# Patient Record
Sex: MALE | Race: WHITE | NOT HISPANIC OR LATINO | URBAN - METROPOLITAN AREA
[De-identification: names, ages, dates, MRNs, and addresses within clinical notes are randomized per-mention and may not be internally consistent; named-entity substitution may affect disease eponyms.]

---

## 2020-07-25 ENCOUNTER — TELEPHONE ENCOUNTER (OUTPATIENT)
Dept: URBAN - METROPOLITAN AREA CLINIC 13 | Facility: CLINIC | Age: 35
End: 2020-07-25

## 2020-07-26 ENCOUNTER — TELEPHONE ENCOUNTER (OUTPATIENT)
Dept: URBAN - METROPOLITAN AREA CLINIC 13 | Facility: CLINIC | Age: 35
End: 2020-07-26

## 2020-07-26 RX ORDER — OMEPRAZOLE 40 MG/1
TAKE 1 CAPSULE DAILY CAPSULE, DELAYED RELEASE ORAL
Qty: 30 | Refills: 1 | Status: ACTIVE | COMMUNITY

## 2021-04-16 ENCOUNTER — WEB ENCOUNTER (OUTPATIENT)
Dept: URBAN - METROPOLITAN AREA CLINIC 107 | Facility: CLINIC | Age: 36
End: 2021-04-16

## 2021-04-16 ENCOUNTER — OFFICE VISIT (OUTPATIENT)
Dept: URBAN - METROPOLITAN AREA CLINIC 107 | Facility: CLINIC | Age: 36
End: 2021-04-16
Payer: COMMERCIAL

## 2021-04-16 VITALS
HEIGHT: 71 IN | DIASTOLIC BLOOD PRESSURE: 89 MMHG | BODY MASS INDEX: 27.86 KG/M2 | SYSTOLIC BLOOD PRESSURE: 136 MMHG | TEMPERATURE: 98.6 F | WEIGHT: 199 LBS | RESPIRATION RATE: 18 BRPM | HEART RATE: 81 BPM

## 2021-04-16 DIAGNOSIS — R10.13 EPIGASTRIC PAIN: ICD-10-CM

## 2021-04-16 DIAGNOSIS — K21.9 GASTROESOPHAGEAL REFLUX DISEASE, UNSPECIFIED WHETHER ESOPHAGITIS PRESENT: ICD-10-CM

## 2021-04-16 DIAGNOSIS — K27.9 PUD (PEPTIC ULCER DISEASE): ICD-10-CM

## 2021-04-16 DIAGNOSIS — R19.4 CHANGE IN BOWEL HABIT: ICD-10-CM

## 2021-04-16 PROCEDURE — 99244 OFF/OP CNSLTJ NEW/EST MOD 40: CPT | Performed by: INTERNAL MEDICINE

## 2021-04-16 RX ORDER — OMEPRAZOLE 40 MG/1
TAKE 1 CAPSULE DAILY CAPSULE, DELAYED RELEASE ORAL
Qty: 30 | Refills: 1 | Status: ACTIVE | COMMUNITY

## 2021-04-16 NOTE — HPI-TODAY'S VISIT:
The patient was referred by Dr. Devendra Mascorro for PUD.   A copy of this document is being forwarded to the referring provider.  35-year-old male presenting for an initial evaluation.  He does have a prior history of peptic ulcer disease and a duodenal ulcer in the past. He was previously seen in 2015 and had an upper endoscopy which demonstrated a duodenal ulcer in the bulb.  Biopsies were negative for H. pylori or intestinal metaplasia.  He presents today with similar symptoms and feeling like he has another ulcer. He was taking Advil at that time.   He did take 1 motrin within the last 30 days for a strained back muscle.   He did have a colonoscopy in 2015 by Dr. Marcin Trejo for rectal bleeding.  This exam was unremarkable.  Currently he has epigsatric abdominal pain/bloating. He also has a change in his bowel habits but not any blood. He does have an increased frequency to his bowel movements and they are loose.

## 2021-05-10 ENCOUNTER — OFFICE VISIT (OUTPATIENT)
Dept: URBAN - METROPOLITAN AREA SURGERY CENTER 25 | Facility: SURGERY CENTER | Age: 36
End: 2021-05-10
Payer: COMMERCIAL

## 2021-05-10 ENCOUNTER — CLAIMS CREATED FROM THE CLAIM WINDOW (OUTPATIENT)
Dept: URBAN - METROPOLITAN AREA CLINIC 4 | Facility: CLINIC | Age: 36
End: 2021-05-10
Payer: COMMERCIAL

## 2021-05-10 DIAGNOSIS — K31.89 GASTRIC PERFORATION WITHOUT ULCER: ICD-10-CM

## 2021-05-10 DIAGNOSIS — K20.0 EOSINOPHILIC ESOPHAGITIS: ICD-10-CM

## 2021-05-10 DIAGNOSIS — K21.9 ACID REFLUX: ICD-10-CM

## 2021-05-10 DIAGNOSIS — K22.8 COLUMNAR-LINED ESOPHAGUS: ICD-10-CM

## 2021-05-10 DIAGNOSIS — K31.89 ACQUIRED DEFORMITY OF DUODENUM: ICD-10-CM

## 2021-05-10 PROCEDURE — 88305 TISSUE EXAM BY PATHOLOGIST: CPT | Performed by: PATHOLOGY

## 2021-05-10 PROCEDURE — 43239 EGD BIOPSY SINGLE/MULTIPLE: CPT | Performed by: INTERNAL MEDICINE

## 2021-05-10 PROCEDURE — G8907 PT DOC NO EVENTS ON DISCHARG: HCPCS | Performed by: INTERNAL MEDICINE

## 2021-05-10 PROCEDURE — 88312 SPECIAL STAINS GROUP 1: CPT | Performed by: PATHOLOGY

## 2021-05-10 RX ORDER — OMEPRAZOLE 40 MG/1
TAKE 1 CAPSULE DAILY CAPSULE, DELAYED RELEASE ORAL
Qty: 30 | Refills: 1 | Status: ACTIVE | COMMUNITY

## 2021-06-03 ENCOUNTER — LAB OUTSIDE AN ENCOUNTER (OUTPATIENT)
Dept: URBAN - METROPOLITAN AREA CLINIC 113 | Facility: CLINIC | Age: 36
End: 2021-06-03

## 2021-06-03 ENCOUNTER — OFFICE VISIT (OUTPATIENT)
Dept: URBAN - METROPOLITAN AREA CLINIC 113 | Facility: CLINIC | Age: 36
End: 2021-06-03
Payer: COMMERCIAL

## 2021-06-03 VITALS
TEMPERATURE: 97.8 F | RESPIRATION RATE: 18 BRPM | WEIGHT: 200 LBS | SYSTOLIC BLOOD PRESSURE: 119 MMHG | HEART RATE: 77 BPM | HEIGHT: 71 IN | DIASTOLIC BLOOD PRESSURE: 83 MMHG | BODY MASS INDEX: 28 KG/M2

## 2021-06-03 DIAGNOSIS — K21.00 GASTROESOPHAGEAL REFLUX DISEASE WITH ESOPHAGITIS WITHOUT HEMORRHAGE: ICD-10-CM

## 2021-06-03 DIAGNOSIS — R10.13 EPIGASTRIC PAIN: ICD-10-CM

## 2021-06-03 PROBLEM — 13200003: Status: ACTIVE | Noted: 2021-04-16

## 2021-06-03 PROBLEM — 79922009: Status: ACTIVE | Noted: 2021-04-16

## 2021-06-03 PROCEDURE — 99213 OFFICE O/P EST LOW 20 MIN: CPT | Performed by: INTERNAL MEDICINE

## 2021-06-03 RX ORDER — OMEPRAZOLE 40 MG/1
1 CAPSULE CAPSULE, DELAYED RELEASE ORAL TWICE A DAY
Qty: 60 | Refills: 6 | OUTPATIENT

## 2021-06-03 RX ORDER — OMEPRAZOLE 40 MG/1
TAKE 1 CAPSULE DAILY CAPSULE, DELAYED RELEASE ORAL
Qty: 30 | Refills: 1 | Status: ACTIVE | COMMUNITY

## 2021-06-03 NOTE — HPI-OTHER HISTORIES
EGD 5/10/2021: LA grade B esophagitis status post biopsy, gastritis status post biopsy, erythematous to adenopathy.  Pathology: Stomach antral and body biopsies demonstrated chemical/reactive gastropathy without H. pylori or intestinal metaplasia.  GE junction biopsy demonstrated squamocolumnar mucosa with markedly increased intraepithelial eosinophils (25 per high-power field) arising in a background of reflux type changes.

## 2021-06-03 NOTE — HPI-TODAY'S VISIT:
This is a 35-year-old male with a history of gastric and peptic ulcer (2015) presenting for follow-up after labs and an EGD were performed to evaluate epigastric pain and bloating.  He was initially seen 4/16/2021 referred from his primary care physician for epigastric pain.  He also reported an increase in stool frequency and loose bowel movements.  Prior EGD revealed a duodenal ulcer (2015).  He admitted taking Motrin for back pain.  Symptoms were persistent on omeprazole 40 mg daily.  Labs were ordered and he was scheduled for an EGD.  After the EGD, omeprazole was increased to twice a day.  Repeat EGD recommended in 8 weeks. He states he took omeprazole 40 mg twice a day from 0822-3621.  He decreased to once a day late in 2017.  He has not increased therapy as he does not recall that he was instructed to do so.  He denies dysphagia, heartburn, abdominal pain, nausea, or any other abdominal symptoms. There are no lab reports available.

## 2021-06-05 ENCOUNTER — DASHBOARD ENCOUNTERS (OUTPATIENT)
Age: 36
End: 2021-06-05

## 2021-08-02 ENCOUNTER — OFFICE VISIT (OUTPATIENT)
Dept: URBAN - METROPOLITAN AREA SURGERY CENTER 25 | Facility: SURGERY CENTER | Age: 36
End: 2021-08-02
Payer: COMMERCIAL

## 2021-08-02 ENCOUNTER — CLAIMS CREATED FROM THE CLAIM WINDOW (OUTPATIENT)
Dept: URBAN - METROPOLITAN AREA CLINIC 4 | Facility: CLINIC | Age: 36
End: 2021-08-02
Payer: COMMERCIAL

## 2021-08-02 DIAGNOSIS — K21.9 ACID REFLUX: ICD-10-CM

## 2021-08-02 DIAGNOSIS — K21.9 GASTRO-ESOPHAGEAL REFLUX DISEASE WITHOUT ESOPHAGITIS: ICD-10-CM

## 2021-08-02 PROBLEM — 266433003: Status: ACTIVE | Noted: 2021-06-03

## 2021-08-02 PROCEDURE — 88312 SPECIAL STAINS GROUP 1: CPT | Performed by: PATHOLOGY

## 2021-08-02 PROCEDURE — 43239 EGD BIOPSY SINGLE/MULTIPLE: CPT | Performed by: INTERNAL MEDICINE

## 2021-08-02 PROCEDURE — 88305 TISSUE EXAM BY PATHOLOGIST: CPT | Performed by: PATHOLOGY

## 2021-08-02 PROCEDURE — G8907 PT DOC NO EVENTS ON DISCHARG: HCPCS | Performed by: INTERNAL MEDICINE

## 2021-08-02 RX ORDER — OMEPRAZOLE 40 MG/1
1 CAPSULE CAPSULE, DELAYED RELEASE ORAL TWICE A DAY
Qty: 60 | Refills: 6 | Status: ACTIVE | COMMUNITY

## 2021-08-02 RX ORDER — OMEPRAZOLE 40 MG/1
TAKE 1 CAPSULE DAILY CAPSULE, DELAYED RELEASE ORAL
Qty: 30 | Refills: 1 | Status: ACTIVE | COMMUNITY

## 2021-09-14 ENCOUNTER — OFFICE VISIT (OUTPATIENT)
Dept: URBAN - METROPOLITAN AREA CLINIC 107 | Facility: CLINIC | Age: 36
End: 2021-09-14

## 2021-09-14 NOTE — HPI-TODAY'S VISIT:
This is a 36-year-old male with a history of gastric and peptic ulcer (2015), LA grade B esophagitis, and GERD presenting for follow-up after an EGD was performed to assess healing. He was last seen 6/3/2021 following an EGD demonstrating LA grade B esophagitis with markedly increased eosinophils on biopsy in a background of reflux type changes.  He was doing well on omeprazole 40 mg daily.  This was increased to twice a day per prior recommendation.  An EGD was arranged in 2 months to assess for healing. EGD 8/2/2021:LA grade a reflux esophagitis status post biopsy, small hiatal hernia, normal gastric body and antrum, normal examined duodenum.  GE junction biopsies demonstrated squamous mucosa with reflux type changes; no columnar mucosa identified.  EGD recommended in 1 year for surveillance.